# Patient Record
Sex: MALE | Race: WHITE | NOT HISPANIC OR LATINO | Employment: OTHER | URBAN - METROPOLITAN AREA
[De-identification: names, ages, dates, MRNs, and addresses within clinical notes are randomized per-mention and may not be internally consistent; named-entity substitution may affect disease eponyms.]

---

## 2017-02-13 ENCOUNTER — GENERIC CONVERSION - ENCOUNTER (OUTPATIENT)
Dept: OTHER | Facility: OTHER | Age: 82
End: 2017-02-13

## 2017-02-14 ENCOUNTER — GENERIC CONVERSION - ENCOUNTER (OUTPATIENT)
Dept: OTHER | Facility: OTHER | Age: 82
End: 2017-02-14

## 2017-02-15 ENCOUNTER — GENERIC CONVERSION - ENCOUNTER (OUTPATIENT)
Dept: OTHER | Facility: OTHER | Age: 82
End: 2017-02-15

## 2017-08-08 ENCOUNTER — GENERIC CONVERSION - ENCOUNTER (OUTPATIENT)
Dept: OTHER | Facility: OTHER | Age: 82
End: 2017-08-08

## 2017-11-28 ENCOUNTER — GENERIC CONVERSION - ENCOUNTER (OUTPATIENT)
Dept: OTHER | Facility: OTHER | Age: 82
End: 2017-11-28

## 2018-01-23 ENCOUNTER — ALLSCRIPTS OFFICE VISIT (OUTPATIENT)
Dept: OTHER | Facility: OTHER | Age: 83
End: 2018-01-23

## 2018-01-24 NOTE — PROGRESS NOTES
Assessment    1  Chronic obstructive pulmonary disease (496) (J44 9)   2  Intracerebral hemorrhage (431) (I61 9)   3  Paroxysmal atrial fibrillation (427 31) (I48 0)    Plan  Adult hypothyroidism    · Levothyroxine Sodium 50 MCG Oral Tablet; TAKE 1 TABLET DAILY -TAKE ON  EMPTY STOMACH  Benign prostatic hypertrophy    · Finasteride 5 MG Oral Tablet (Proscar)   · Dutasteride 0 5 MG Oral Capsule; take 1 capsule daily  Headache    · Amitriptyline HCl - 50 MG Oral Tablet  Health Maintenance    · Vitamin D (Ergocalciferol) 62540 UNIT Oral Capsule; TAKE 1 CAPSULE WEEKLY  Intracerebral hemorrhage    · Eliquis 2 5 MG Oral Tablet; take 1 tablet by mouth twice a day  Unlinked    · Flomax 0 4 MG Oral Capsule (Tamsulosin HCl)    Discussion/Summary  Possible side effects of new medications were reviewed with the patient/guardian today  The treatment plan was reviewed with the patient/guardian  The patient/guardian understands and agrees with the treatment plan      Chief Complaint  FOLLOW UP FROM [facility] IN DECEMBER 21 D/C JANUARY 5  SON STATES PT WAS IN 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  He has h/o intracerebral hemorrhage  He is now on Eliquis  His lipids are at goal on simvastatin  He has COPD  He is taking Serevent and Ventolin  He denies cough, congestion, or sputum production  His flu shot, Pneumovax, and Prevnar-13 are UTD  Review of Systems    Constitutional: No fever or chills, feels well, no tiredness, no recent weight gain or weight loss     Eyes: No complaints of eye pain, no red eyes, no discharge from eyes, no itchy eyes  ENT: no complaints of earache, no hearing loss, no nosebleeds, no nasal discharge, no sore throat, no hoarseness  Cardiovascular: No complaints of slow heart rate, no fast heart rate, no chest pain, no palpitations, no leg claudication, no lower extremity  Respiratory: No complaints of shortness of breath, no wheezing, no cough, no SOB on exertion, no orthopnea or PND  Gastrointestinal: No complaints of abdominal pain, no constipation, no nausea or vomiting, no diarrhea or bloody stools  Genitourinary: No complaints of dysuria, no incontinence, no hesitancy, no nocturia, no genital lesion, no testicular pain  Musculoskeletal: No complaints of arthralgia, no myalgias, no joint swelling or stiffness, no limb pain or swelling  Integumentary: No complaints of skin rash or skin lesions, no itching, no skin wound, no dry skin  Neurological: No compliants of headache, no confusion, no convulsions, no numbness or tingling, no dizziness or fainting, no limb weakness, no difficulty walking  Psychiatric: Is not suicidal, no sleep disturbances, no anxiety or depression, no change in personality, no emotional problems  Endocrine: No complaints of proptosis, no hot flashes, no muscle weakness, no erectile dysfunction, no deepening of the voice, no feelings of weakness  Hematologic/Lymphatic: No complaints of swollen glands, no swollen glands in the neck, does not bleed easily, no easy bruising  Active Problems    1  Abnormal weight loss (783 21) (R63 4)   2  Adult hypothyroidism (244 9) (E03 9)   3  Arthralgia of right ankle (719 47) (M25 571)   4  Arthritis (716 90) (M19 90)   5  Benign prostatic hypertrophy (600 00) (N40 0)   6  Carotid artery occlusion syndrome (435 8) (G45 1)   7  Chronic obstructive pulmonary disease (496) (J44 9)   8  Cough (786 2) (R05)   9  Dementia without behavioral disturbance (294 20) (F03 90)   10   Depression screen (V79 0) (Z13 89)   11  Emphysema (492 8) (J43 9)   12  Encounter for screening for other nervous system disorder (V80 09) (Z13 858)   13  Encounter for special screening examination for genitourinary disorder (V81 6) (Z13 89)   14  Fatigue (780 79) (R53 83)   15  Headache (784 0) (R51)   16  Hyperlipidemia (272 4) (E78 5)   17  Hypertension (401 9) (I10)   18  Intracerebral hemorrhage (431) (I61 9)   19  Lumbar radiculopathy (724 4) (M54 16)   20  Macular degeneration (362 50) (H35 30)   21  Neck pain (723 1) (M54 2)   22  Need for influenza vaccination (V04 81) (Z23)   23  Need for pneumococcal vaccination (V03 82) (Z23)   24  Otalgia, unspecified laterality (388 70) (H92 09)   25  Pain in ankle joint (719 47) (M25 579)   26  Paroxysmal atrial fibrillation (427 31) (I48 0)   27  Pigmented skin lesion (709 00) (L81 9)   28  Preop examination (V72 84) (Z01 818)   29  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)   30  Rhinorrhea (478 19) (J34 89)   31  Skin rash (782 1) (R21)   32  Vertigo (780 4) (R42)    Family History  Son    1  Family history of HTN (hypertension)    Social History    · Denied: History of Alcohol   · Denied: History of Drug Use   · Former smoker (V15 82) (T93 409)    Current Meds   1  Amitriptyline HCl - 50 MG Oral Tablet; TAKE 1 TABLET AT BEDTIME; Therapy: 93Tkz8476 to (Evaluate:18Nov2016)  Requested for: 19Oct2016; Last   Rx:19Oct2016 Ordered   2  Avodart 0 5 MG Oral Capsule; Therapy: (Recorded:14Jan2013) to Recorded   3  Donepezil HCl - 10 MG Oral Tablet; Take 1 tablet daily as directed; Therapy: 29Xji9545 to (Evaluate:30Jan2018)  Requested for: 15Hbm5528; Last   Rx:23Vxs5036 Ordered   4  Eliquis 2 5 MG Oral Tablet; take 1 tablet by mouth twice a day; Therapy: 71Zno2147 to (Evaluate:11Xfl2508)  Requested for: 21AHG5665; Last   Rx:06Nov2017 Ordered   5  Flomax 0 4 MG Oral Capsule; Therapy: (Recorded:14Jan2013) to Recorded   6   Gabapentin 300 MG Oral Capsule; take 1 capsule by mouth at bedtime; Therapy: 39Zbu7241 to (Evaluate:04Jun2018)  Requested for: 48VGY8559; Last   Rx:05Rmr1812 Ordered   7  Levothyroxine Sodium 50 MCG Oral Tablet; TAKE 1 TABLET DAILY -TAKE ON EMPTY   STOMACH; Therapy: 79QTS5619 to (Evaluate:19Ddt0301)  Requested for: 27Jun2017; Last   NM:[id number] Ordered   8  Mirtazapine 15 MG Oral Tablet; TAKE 1 TABLET AT BEDTIME; Therapy: 94Lwp0382 to (Evaluate:74Kva9696)  Requested for: 60VEA7640; Last   Rx:29Rdi8381 Ordered   9  Simvastatin 10 MG Oral Tablet; take 1 tablet daily at bedtime; Therapy: 22Efg3021 to (Evaluate:04Cxq7199)  Requested for: 37Hul0318; Last   Rx:14Vfy1258 Ordered   10  Tamsulosin HCl - 0 4 MG Oral Capsule; TAKE 1 CAPSULE BY MOUTH DAILY 30    MINUTES AFTER SAME MEAL EACH DAY; Therapy: 45APG7205 to ([phone])  Requested for: 51TZQ2597; Last    Rx:19Jan2018 Ordered   11  Ventolin  (90 Base) MCG/ACT Inhalation Aerosol Solution; Therapy: (Recorded:14Jan2013) to Recorded    Allergies    1  No Known Drug Allergies    Vitals  Vital Signs    Recorded: 38ZQS2727 08:00AM Recorded: 13YTK4877 07:58AM   Heart Rate 72 74   Respiration 15 15   Systolic 649    Diastolic 68    Height 5 ft 10 in 5 ft 10 in   Weight 161 lb 6 oz 161 lb 6 oz   BMI Calculated 23 16 23 16   BSA Calculated 1 91 1 91   O2 Saturation 93 92     Physical Exam    Constitutional   General appearance: No acute distress, well appearing and well nourished  Pulmonary   Respiratory effort: No increased work of breathing or signs of respiratory distress  irregularly irregular  Auscultation of lungs: Clear to auscultation, equal breath sounds bilaterally, no wheezes, no rales, no rhonci  Cardiovascular   Auscultation of heart: Abnormal     Examination of extremities for edema and/or varicosities: Normal     Abdomen   Abdomen: Non-tender, no masses  Liver and spleen: No hepatomegaly or splenomegaly           Results/Data  PHQ-2 Adult Depression Screening 23Jan2018 08:02AM User, Ahs     Test Name Result Flag Reference   PHQ-2 Adult Depression Score 0     Over the last two weeks, how often have you been bothered by any of the following problems?   Little interest or pleasure in doing things: Not at all - 0  Feeling down, depressed, or hopeless: Not at all - 0   PHQ-2 Adult Depression Screening Negative         Signatures   Electronically signed by : Tuyet Nguyen MD; Jan 23 2018  8:43AM EST                       (Author)

## 2018-03-21 DIAGNOSIS — F03.90 DEMENTIA WITHOUT BEHAVIORAL DISTURBANCE, UNSPECIFIED DEMENTIA TYPE (HCC): Primary | ICD-10-CM

## 2018-03-21 RX ORDER — DONEPEZIL HYDROCHLORIDE 10 MG/1
10 TABLET, FILM COATED ORAL DAILY
Qty: 90 TABLET | Refills: 3 | Status: SHIPPED | OUTPATIENT
Start: 2018-03-21

## 2018-03-21 RX ORDER — DONEPEZIL HYDROCHLORIDE 10 MG/1
1 TABLET, FILM COATED ORAL DAILY
COMMUNITY
Start: 2016-09-13 | End: 2018-03-21 | Stop reason: SDUPTHER

## 2018-05-14 DIAGNOSIS — I48.91 ATRIAL FIBRILLATION, UNSPECIFIED TYPE (HCC): Primary | ICD-10-CM

## 2018-05-15 RX ORDER — APIXABAN 2.5 MG/1
TABLET, FILM COATED ORAL
Qty: 60 TABLET | Refills: 5 | Status: SHIPPED | OUTPATIENT
Start: 2018-05-15

## 2018-05-21 DIAGNOSIS — F32.A DEPRESSION, UNSPECIFIED DEPRESSION TYPE: Primary | ICD-10-CM

## 2018-05-21 RX ORDER — MIRTAZAPINE 15 MG/1
TABLET, FILM COATED ORAL
Qty: 30 TABLET | Refills: 0 | Status: SHIPPED | OUTPATIENT
Start: 2018-05-21 | End: 2018-06-21 | Stop reason: SDUPTHER

## 2018-06-18 DIAGNOSIS — R52 PAIN: Primary | ICD-10-CM

## 2018-06-19 RX ORDER — GABAPENTIN 300 MG/1
CAPSULE ORAL
Qty: 30 CAPSULE | Refills: 5 | Status: SHIPPED | OUTPATIENT
Start: 2018-06-19 | End: 2018-12-15 | Stop reason: SDUPTHER

## 2018-06-20 RX ORDER — DUTASTERIDE 0.5 MG/1
1 CAPSULE, LIQUID FILLED ORAL DAILY
COMMUNITY

## 2018-06-20 RX ORDER — AMLODIPINE BESYLATE 5 MG/1
1 TABLET ORAL DAILY
COMMUNITY
Start: 2013-11-19

## 2018-06-20 RX ORDER — FINASTERIDE 5 MG/1
1 TABLET, FILM COATED ORAL DAILY
COMMUNITY
Start: 2018-01-19

## 2018-06-20 RX ORDER — SIMVASTATIN 10 MG
1 TABLET ORAL
COMMUNITY
Start: 2014-08-28 | End: 2018-10-16 | Stop reason: SDUPTHER

## 2018-06-20 RX ORDER — QUETIAPINE FUMARATE 25 MG/1
1 TABLET, FILM COATED ORAL
COMMUNITY
Start: 2018-01-19 | End: 2018-07-18 | Stop reason: SDUPTHER

## 2018-06-20 RX ORDER — AMITRIPTYLINE HYDROCHLORIDE 50 MG/1
1 TABLET, FILM COATED ORAL
COMMUNITY
Start: 2016-04-11

## 2018-06-20 RX ORDER — TAMSULOSIN HYDROCHLORIDE 0.4 MG/1
CAPSULE ORAL
COMMUNITY
End: 2018-07-18 | Stop reason: SDUPTHER

## 2018-06-20 RX ORDER — ALBUTEROL SULFATE 90 UG/1
AEROSOL, METERED RESPIRATORY (INHALATION)
COMMUNITY

## 2018-06-20 RX ORDER — ERGOCALCIFEROL 1.25 MG/1
1 CAPSULE ORAL WEEKLY
COMMUNITY
Start: 2018-01-19 | End: 2018-07-30 | Stop reason: SDUPTHER

## 2018-06-20 RX ORDER — LEVOTHYROXINE SODIUM 0.05 MG/1
TABLET ORAL
COMMUNITY
Start: 2016-04-04 | End: 2018-08-20 | Stop reason: SDUPTHER

## 2018-06-21 ENCOUNTER — OFFICE VISIT (OUTPATIENT)
Dept: FAMILY MEDICINE CLINIC | Facility: CLINIC | Age: 83
End: 2018-06-21
Payer: MEDICARE

## 2018-06-21 VITALS
WEIGHT: 159.8 LBS | OXYGEN SATURATION: 94 % | BODY MASS INDEX: 22.88 KG/M2 | HEART RATE: 84 BPM | SYSTOLIC BLOOD PRESSURE: 122 MMHG | DIASTOLIC BLOOD PRESSURE: 76 MMHG | HEIGHT: 70 IN | RESPIRATION RATE: 15 BRPM

## 2018-06-21 DIAGNOSIS — M75.91 RIGHT SUPRASPINATUS TENDINITIS: ICD-10-CM

## 2018-06-21 DIAGNOSIS — F32.A DEPRESSION, UNSPECIFIED DEPRESSION TYPE: ICD-10-CM

## 2018-06-21 DIAGNOSIS — J34.89 NASAL DISCHARGE: Primary | ICD-10-CM

## 2018-06-21 PROBLEM — M75.90 SUPRASPINATUS TENDONITIS: Status: ACTIVE | Noted: 2018-06-21

## 2018-06-21 PROCEDURE — 20550 NJX 1 TENDON SHEATH/LIGAMENT: CPT | Performed by: FAMILY MEDICINE

## 2018-06-21 PROCEDURE — 99214 OFFICE O/P EST MOD 30 MIN: CPT | Performed by: FAMILY MEDICINE

## 2018-06-21 RX ORDER — DEXAMETHASONE SODIUM PHOSPHATE 4 MG/ML
4 INJECTION, SOLUTION INTRA-ARTICULAR; INTRALESIONAL; INTRAMUSCULAR; INTRAVENOUS; SOFT TISSUE ONCE
Status: SHIPPED | OUTPATIENT
Start: 2018-06-21 | End: 2038-06-22

## 2018-06-21 RX ORDER — AZELASTINE 1 MG/ML
1 SPRAY, METERED NASAL 2 TIMES DAILY
Qty: 30 ML | Refills: 0 | Status: SHIPPED | OUTPATIENT
Start: 2018-06-21

## 2018-06-21 RX ORDER — MIRTAZAPINE 15 MG/1
TABLET, FILM COATED ORAL
Qty: 30 TABLET | Refills: 5 | Status: SHIPPED | OUTPATIENT
Start: 2018-06-21 | End: 2018-12-21 | Stop reason: SDUPTHER

## 2018-06-21 RX ORDER — IPRATROPIUM BROMIDE 42 UG/1
2 SPRAY, METERED NASAL 4 TIMES DAILY
COMMUNITY

## 2018-06-21 NOTE — PROGRESS NOTES
Assessment/Plan:    No problem-specific Assessment & Plan notes found for this encounter  Diagnoses and all orders for this visit:    Nasal discharge  -     azelastine (ASTELIN) 0 1 % nasal spray; 1 spray into each nostril 2 (two) times a day Use in each nostril as directed    Right supraspinatus tendinitis  -     Ambulatory referral to Physical Therapy; Future  -     XR shoulder 2+ vw right; Future  -     dexamethasone (DECADRON) injection 4 mg; Inject 1 mL (4 mg total) into the shoulder, thigh, or buttocks once     Other orders  -     amitriptyline (ELAVIL) 50 mg tablet; Take 1 tablet by mouth  -     amLODIPine (NORVASC) 5 mg tablet; Take 1 tablet by mouth daily  -     dutasteride (AVODART) 0 5 mg capsule; Take 1 capsule by mouth daily  -     finasteride (PROSCAR) 5 mg tablet; Take 1 tablet by mouth daily  -     tamsulosin (FLOMAX) 0 4 mg; Take by mouth  -     levothyroxine 50 mcg tablet; Take by mouth  -     QUEtiapine (SEROquel) 25 mg tablet; Take 1 tablet by mouth  -     salmeterol (SEREVENT DISKUS) 50 mcg/dose diskus inhaler; Inhale 1 puff every 12 (twelve) hours  -     simvastatin (ZOCOR) 10 mg tablet; Take 1 tablet by mouth  -     albuterol (VENTOLIN HFA) 90 mcg/act inhaler; Inhale  -     ergocalciferol (VITAMIN D2) 50,000 units; Take 1 capsule by mouth once a week  -     ipratropium (ATROVENT) 0 06 % nasal spray; 2 sprays into each nostril 4 (four) times a day          Subjective:      Patient ID: La Moody is a 80 y o  male  He has pain in the right shoulder for about the last month  He has trouble lifting his arm in front of him and at the side  He has no distinct trauma although he does state that he was lifting things from his home to take to an auction  It does not bother him at night  He has no constitutional symptoms  He has pain in the left inguinal area  He was told in the past that he has hernia  This is bothering him intermittently  Informred consent obtained    I injected his subdeltoid bursa on the right without difficulty  The following portions of the patient's history were reviewed and updated as appropriate:   He  has no past medical history on file  He   Patient Active Problem List    Diagnosis Date Noted    Supraspinatus tendonitis 06/21/2018     He  has no past surgical history on file  His family history includes Hypertension in his son; No Known Problems in his father and mother  He  reports that he has quit smoking  He has never used smokeless tobacco  He reports that he does not drink alcohol or use drugs    Current Outpatient Prescriptions   Medication Sig Dispense Refill    ipratropium (ATROVENT) 0 06 % nasal spray 2 sprays into each nostril 4 (four) times a day      albuterol (VENTOLIN HFA) 90 mcg/act inhaler Inhale      amitriptyline (ELAVIL) 50 mg tablet Take 1 tablet by mouth      amLODIPine (NORVASC) 5 mg tablet Take 1 tablet by mouth daily      azelastine (ASTELIN) 0 1 % nasal spray 1 spray into each nostril 2 (two) times a day Use in each nostril as directed 30 mL 0    donepezil (ARICEPT) 10 mg tablet Take 1 tablet (10 mg total) by mouth daily 90 tablet 3    dutasteride (AVODART) 0 5 mg capsule Take 1 capsule by mouth daily      ELIQUIS 2 5 MG TAKE 1 TABLET BY MOUTH TWICE A DAY 60 tablet 5    ergocalciferol (VITAMIN D2) 50,000 units Take 1 capsule by mouth once a week      finasteride (PROSCAR) 5 mg tablet Take 1 tablet by mouth daily      gabapentin (NEURONTIN) 300 mg capsule TAKE 1 CAPSULE BY MOUTH AT BEDTIME 30 capsule 5    levothyroxine 50 mcg tablet Take by mouth      mirtazapine (REMERON) 15 mg tablet TAKE 1 TABLET BY MOUTH AT BEDTIME 30 tablet 5    QUEtiapine (SEROquel) 25 mg tablet Take 1 tablet by mouth      salmeterol (SEREVENT DISKUS) 50 mcg/dose diskus inhaler Inhale 1 puff every 12 (twelve) hours      simvastatin (ZOCOR) 10 mg tablet Take 1 tablet by mouth      tamsulosin (FLOMAX) 0 4 mg Take by mouth       Current Facility-Administered Medications   Medication Dose Route Frequency Provider Last Rate Last Dose    dexamethasone (DECADRON) injection 4 mg  4 mg Intramuscular Once Adrian Fernando MD         Current Outpatient Prescriptions on File Prior to Visit   Medication Sig    donepezil (ARICEPT) 10 mg tablet Take 1 tablet (10 mg total) by mouth daily    ELIQUIS 2 5 MG TAKE 1 TABLET BY MOUTH TWICE A DAY    gabapentin (NEURONTIN) 300 mg capsule TAKE 1 CAPSULE BY MOUTH AT BEDTIME    [DISCONTINUED] mirtazapine (REMERON) 15 mg tablet TAKE 1 TABLET BY MOUTH AT BEDTIME     No current facility-administered medications on file prior to visit  He has No Known Allergies       Review of Systems   All other systems reviewed and are negative  Objective:      /76 (BP Location: Right arm, Patient Position: Sitting, Cuff Size: Standard)   Pulse 84   Resp 15   Ht 5' 10" (1 778 m)   Wt 72 5 kg (159 lb 12 8 oz)   SpO2 94%   BMI 22 93 kg/m²          Physical Exam   Constitutional: He is oriented to person, place, and time  He appears well-developed and well-nourished  Neck: Normal range of motion  Neck supple  Cardiovascular: Normal rate, regular rhythm, normal heart sounds and intact distal pulses  Pulmonary/Chest: Effort normal and breath sounds normal    Abdominal: Soft  Bowel sounds are normal    Musculoskeletal: Normal range of motion  Neurological: He is alert and oriented to person, place, and time  He has normal reflexes  Skin: Skin is warm and dry  Psychiatric: He has a normal mood and affect  His behavior is normal  Judgment and thought content normal    Nursing note and vitals reviewed

## 2018-06-26 DIAGNOSIS — J43.9 PULMONARY EMPHYSEMA, UNSPECIFIED EMPHYSEMA TYPE (HCC): Primary | ICD-10-CM

## 2018-06-26 RX ORDER — SALMETEROL XINAFOATE 50 MCG
BLISTER, WITH INHALATION DEVICE INHALATION
Qty: 1 INHALER | Refills: 5 | Status: SHIPPED | OUTPATIENT
Start: 2018-06-26 | End: 2019-01-26 | Stop reason: SDUPTHER

## 2018-07-18 DIAGNOSIS — F03.90 DEMENTIA WITHOUT BEHAVIORAL DISTURBANCE, UNSPECIFIED DEMENTIA TYPE (HCC): ICD-10-CM

## 2018-07-18 DIAGNOSIS — N40.0 BENIGN PROSTATIC HYPERPLASIA, UNSPECIFIED WHETHER LOWER URINARY TRACT SYMPTOMS PRESENT: Primary | ICD-10-CM

## 2018-07-19 RX ORDER — TAMSULOSIN HYDROCHLORIDE 0.4 MG/1
CAPSULE ORAL
Qty: 30 CAPSULE | Refills: 5 | Status: SHIPPED | OUTPATIENT
Start: 2018-07-19 | End: 2019-01-14 | Stop reason: SDUPTHER

## 2018-07-19 RX ORDER — QUETIAPINE FUMARATE 25 MG/1
TABLET, FILM COATED ORAL
Qty: 30 TABLET | Refills: 5 | Status: SHIPPED | OUTPATIENT
Start: 2018-07-19 | End: 2019-01-08

## 2018-07-30 DIAGNOSIS — E55.9 VITAMIN D DEFICIENCY: Primary | ICD-10-CM

## 2018-07-30 RX ORDER — ERGOCALCIFEROL 1.25 MG/1
CAPSULE ORAL WEEKLY
Qty: 4 CAPSULE | Refills: 3 | Status: SHIPPED | OUTPATIENT
Start: 2018-07-30 | End: 2018-12-21 | Stop reason: SDUPTHER

## 2018-08-02 ENCOUNTER — TELEPHONE (OUTPATIENT)
Dept: FAMILY MEDICINE CLINIC | Facility: CLINIC | Age: 83
End: 2018-08-02

## 2018-08-02 DIAGNOSIS — M75.91 RIGHT SUPRASPINATUS TENDINITIS: Primary | ICD-10-CM

## 2018-08-02 DIAGNOSIS — M79.601 PAIN IN BOTH UPPER EXTREMITIES: ICD-10-CM

## 2018-08-02 DIAGNOSIS — M79.602 PAIN IN BOTH UPPER EXTREMITIES: ICD-10-CM

## 2018-08-02 NOTE — TELEPHONE ENCOUNTER
Son called asking for script for PT for pulled muscle in the right arm   Asking it to be sent to [facility]

## 2018-08-03 PROBLEM — M79.602 PAIN IN BOTH UPPER EXTREMITIES: Status: ACTIVE | Noted: 2018-08-03

## 2018-08-03 PROBLEM — M79.601 PAIN IN BOTH UPPER EXTREMITIES: Status: ACTIVE | Noted: 2018-08-03

## 2018-08-20 DIAGNOSIS — E03.9 HYPOTHYROIDISM, UNSPECIFIED TYPE: Primary | ICD-10-CM

## 2018-08-21 RX ORDER — LEVOTHYROXINE SODIUM 0.05 MG/1
TABLET ORAL
Qty: 30 TABLET | Refills: 5 | Status: SHIPPED | OUTPATIENT
Start: 2018-08-21

## 2018-08-31 ENCOUNTER — OFFICE VISIT (OUTPATIENT)
Dept: FAMILY MEDICINE CLINIC | Facility: CLINIC | Age: 83
End: 2018-08-31
Payer: MEDICARE

## 2018-08-31 VITALS
WEIGHT: 160 LBS | SYSTOLIC BLOOD PRESSURE: 122 MMHG | HEART RATE: 88 BPM | BODY MASS INDEX: 22.9 KG/M2 | OXYGEN SATURATION: 94 % | HEIGHT: 70 IN | DIASTOLIC BLOOD PRESSURE: 76 MMHG | RESPIRATION RATE: 15 BRPM

## 2018-08-31 DIAGNOSIS — M79.602 PAIN IN BOTH UPPER EXTREMITIES: ICD-10-CM

## 2018-08-31 DIAGNOSIS — M79.601 PAIN IN BOTH UPPER EXTREMITIES: ICD-10-CM

## 2018-08-31 DIAGNOSIS — G89.29 CHRONIC RIGHT SHOULDER PAIN: Primary | ICD-10-CM

## 2018-08-31 DIAGNOSIS — M25.511 CHRONIC RIGHT SHOULDER PAIN: Primary | ICD-10-CM

## 2018-08-31 DIAGNOSIS — Z23 NEED FOR INFLUENZA VACCINATION: ICD-10-CM

## 2018-08-31 DIAGNOSIS — E03.9 ADULT HYPOTHYROIDISM: ICD-10-CM

## 2018-08-31 DIAGNOSIS — I10 HYPERTENSION, UNSPECIFIED TYPE: ICD-10-CM

## 2018-08-31 PROCEDURE — G0008 ADMIN INFLUENZA VIRUS VAC: HCPCS

## 2018-08-31 PROCEDURE — 90662 IIV NO PRSV INCREASED AG IM: CPT

## 2018-08-31 PROCEDURE — 99214 OFFICE O/P EST MOD 30 MIN: CPT | Performed by: FAMILY MEDICINE

## 2018-08-31 NOTE — PROGRESS NOTES
Assessment/Plan:    No problem-specific Assessment & Plan notes found for this encounter  Diagnoses and all orders for this visit:    Chronic right shoulder pain  -     TSH, 3rd generation; Future  -     T4, free; Future  -     Comprehensive metabolic panel; Future  -     CBC and differential; Future    Hypertension, unspecified type  -     TSH, 3rd generation; Future  -     T4, free; Future  -     Comprehensive metabolic panel; Future  -     CBC and differential; Future    Adult hypothyroidism  -     TSH, 3rd generation; Future  -     T4, free; Future  -     Comprehensive metabolic panel; Future  -     CBC and differential; Future    Pain in both upper extremities  -     TSH, 3rd generation; Future  -     T4, free; Future  -     Comprehensive metabolic panel; Future  -     CBC and differential; Future  -     Ambulatory referral to Orthopedic Surgery; Future          Subjective:      Patient ID: Sera Pascual is a 80 y o  male  He has right upper arm pain  He has pain with abduction and forward flexion  He is going to PT but he does not get much relief  I injected his shoulder previously (subacromial space), but he only got about 2 days of relief from it  He has excessive fatigue  He has hypothyroidism  I do not see a recent TSH in his chart  His BP is well controlled in the office today  He has no Cp or SOB  He has no HA or vision changes  The following portions of the patient's history were reviewed and updated as appropriate:   He  has no past medical history on file    He   Patient Active Problem List    Diagnosis Date Noted    Chronic right shoulder pain 08/31/2018    Pain in both upper extremities 08/03/2018    Supraspinatus tendonitis 06/21/2018    Adult hypothyroidism 04/04/2016    Carotid artery occlusion syndrome 03/25/2016    Hypertension 10/15/2013    Paroxysmal atrial fibrillation ([facility] [address] ) 09/26/2013    Hyperlipidemia 02/18/2013    Chronic obstructive pulmonary disease ([facility] ) 01/09/2013    Pulmonary emphysema ([facility] ) 01/09/2013     He  has no past surgical history on file  His family history includes Hypertension in his son; No Known Problems in his father and mother  He  reports that he has quit smoking  He has never used smokeless tobacco  He reports that he does not drink alcohol or use drugs  Current Outpatient Prescriptions   Medication Sig Dispense Refill    albuterol (VENTOLIN HFA) 90 mcg/act inhaler Inhale      amitriptyline (ELAVIL) 50 mg tablet Take 1 tablet by mouth      amLODIPine (NORVASC) 5 mg tablet Take 1 tablet by mouth daily      azelastine (ASTELIN) 0 1 % nasal spray 1 spray into each nostril 2 (two) times a day Use in each nostril as directed 30 mL 0    donepezil (ARICEPT) 10 mg tablet Take 1 tablet (10 mg total) by mouth daily 90 tablet 3    dutasteride (AVODART) 0 5 mg capsule Take 1 capsule by mouth daily      ELIQUIS 2 5 MG TAKE 1 TABLET BY MOUTH TWICE A DAY 60 tablet 5    ergocalciferol (VITAMIN D2) 50,000 units TAKE 1 CAPSULE BY MOUTH ONCE A WEEK 4 capsule 3    finasteride (PROSCAR) 5 mg tablet Take 1 tablet by mouth daily      gabapentin (NEURONTIN) 300 mg capsule TAKE 1 CAPSULE BY MOUTH AT BEDTIME 30 capsule 5    ipratropium (ATROVENT) 0 06 % nasal spray 2 sprays into each nostril 4 (four) times a day      levothyroxine 50 mcg tablet TAKE 1 TABLET EVERY DAY -TAKE ON EMPTY STOMACH 30 tablet 5    mirtazapine (REMERON) 15 mg tablet TAKE 1 TABLET BY MOUTH AT BEDTIME 30 tablet 5    QUEtiapine (SEROquel) 25 mg tablet TAKE 1 TABLET AT BEDTIME 30 tablet 5    SEREVENT DISKUS 50 MCG/DOSE diskus inhaler INHALE 1 PUFF EVERY 12 HOURS   1 Inhaler 5    simvastatin (ZOCOR) 10 mg tablet Take 1 tablet by mouth      tamsulosin (FLOMAX) 0 4 mg TAKE 1 CAPSULE BY MOUTH DAILY 30 MINUTES AFTER SAME MEAL EACH DAY 30 capsule 5     Current Facility-Administered Medications   Medication Dose Route Frequency Provider Last Rate Last Dose    dexamethasone (DECADRON) injection 4 mg  4 mg Intramuscular Once Aries Rojas MD         Current Outpatient Prescriptions on File Prior to Visit   Medication Sig    albuterol (VENTOLIN HFA) 90 mcg/act inhaler Inhale    amitriptyline (ELAVIL) 50 mg tablet Take 1 tablet by mouth    amLODIPine (NORVASC) 5 mg tablet Take 1 tablet by mouth daily    azelastine (ASTELIN) 0 1 % nasal spray 1 spray into each nostril 2 (two) times a day Use in each nostril as directed    donepezil (ARICEPT) 10 mg tablet Take 1 tablet (10 mg total) by mouth daily    dutasteride (AVODART) 0 5 mg capsule Take 1 capsule by mouth daily    ELIQUIS 2 5 MG TAKE 1 TABLET BY MOUTH TWICE A DAY    ergocalciferol (VITAMIN D2) 50,000 units TAKE 1 CAPSULE BY MOUTH ONCE A WEEK    finasteride (PROSCAR) 5 mg tablet Take 1 tablet by mouth daily    gabapentin (NEURONTIN) 300 mg capsule TAKE 1 CAPSULE BY MOUTH AT BEDTIME    ipratropium (ATROVENT) 0 06 % nasal spray 2 sprays into each nostril 4 (four) times a day    levothyroxine 50 mcg tablet TAKE 1 TABLET EVERY DAY -TAKE ON EMPTY STOMACH    mirtazapine (REMERON) 15 mg tablet TAKE 1 TABLET BY MOUTH AT BEDTIME    QUEtiapine (SEROquel) 25 mg tablet TAKE 1 TABLET AT BEDTIME    SEREVENT DISKUS 50 MCG/DOSE diskus inhaler INHALE 1 PUFF EVERY 12 HOURS   simvastatin (ZOCOR) 10 mg tablet Take 1 tablet by mouth    tamsulosin (FLOMAX) 0 4 mg TAKE 1 CAPSULE BY MOUTH DAILY 30 MINUTES AFTER SAME MEAL EACH DAY     Current Facility-Administered Medications on File Prior to Visit   Medication    dexamethasone (DECADRON) injection 4 mg     He has No Known Allergies       Review of Systems      Objective:      /76 (BP Location: Left arm, Patient Position: Sitting, Cuff Size: Standard)   Pulse 88   Resp 15   Ht 5' 10" (1 778 m)   Wt 72 6 kg (160 lb)   SpO2 94%   BMI 22 96 kg/m²          Physical Exam   Constitutional: He is oriented to person, place, and time  He appears well-developed and well-nourished     Neck: Normal range of motion  Cardiovascular: Normal rate, regular rhythm, normal heart sounds and intact distal pulses  Pulmonary/Chest: Effort normal and breath sounds normal    Abdominal: Soft  Bowel sounds are normal    Musculoskeletal: Normal range of motion  Neurological: He is alert and oriented to person, place, and time  He has normal reflexes  Skin: Skin is warm and dry  Psychiatric: He has a normal mood and affect  His behavior is normal  Judgment and thought content normal    Nursing note and vitals reviewed

## 2018-10-16 DIAGNOSIS — E78.5 HYPERLIPIDEMIA, UNSPECIFIED HYPERLIPIDEMIA TYPE: Primary | ICD-10-CM

## 2018-10-16 RX ORDER — SIMVASTATIN 10 MG
TABLET ORAL
Qty: 90 TABLET | Refills: 3 | Status: SHIPPED | OUTPATIENT
Start: 2018-10-16

## 2018-12-15 DIAGNOSIS — R52 PAIN: ICD-10-CM

## 2018-12-16 RX ORDER — GABAPENTIN 300 MG/1
CAPSULE ORAL
Qty: 30 CAPSULE | Refills: 3 | Status: SHIPPED | OUTPATIENT
Start: 2018-12-16

## 2018-12-21 DIAGNOSIS — F32.A DEPRESSION, UNSPECIFIED DEPRESSION TYPE: ICD-10-CM

## 2018-12-21 DIAGNOSIS — E55.9 VITAMIN D DEFICIENCY: ICD-10-CM

## 2018-12-21 RX ORDER — MIRTAZAPINE 15 MG/1
TABLET, FILM COATED ORAL
Qty: 30 TABLET | Refills: 5 | Status: SHIPPED | OUTPATIENT
Start: 2018-12-21

## 2018-12-21 RX ORDER — ERGOCALCIFEROL 1.25 MG/1
CAPSULE ORAL WEEKLY
Qty: 4 CAPSULE | Refills: 3 | Status: SHIPPED | OUTPATIENT
Start: 2018-12-21

## 2019-01-07 ENCOUNTER — TELEPHONE (OUTPATIENT)
Dept: FAMILY MEDICINE CLINIC | Facility: CLINIC | Age: 84
End: 2019-01-07

## 2019-01-07 DIAGNOSIS — F29 PSYCHOSIS, UNSPECIFIED PSYCHOSIS TYPE (HCC): Primary | ICD-10-CM

## 2019-01-08 RX ORDER — QUETIAPINE FUMARATE 50 MG/1
50 TABLET, FILM COATED ORAL
Qty: 30 TABLET | Refills: 5 | Status: SHIPPED | OUTPATIENT
Start: 2019-01-08

## 2019-01-14 DIAGNOSIS — N40.0 BENIGN PROSTATIC HYPERPLASIA, UNSPECIFIED WHETHER LOWER URINARY TRACT SYMPTOMS PRESENT: ICD-10-CM

## 2019-01-14 RX ORDER — TAMSULOSIN HYDROCHLORIDE 0.4 MG/1
CAPSULE ORAL
Qty: 30 CAPSULE | Refills: 5 | Status: SHIPPED | OUTPATIENT
Start: 2019-01-14

## 2019-01-26 DIAGNOSIS — J43.9 PULMONARY EMPHYSEMA, UNSPECIFIED EMPHYSEMA TYPE (HCC): ICD-10-CM

## 2019-01-28 RX ORDER — SALMETEROL XINAFOATE 50 MCG
BLISTER, WITH INHALATION DEVICE INHALATION
Qty: 3 INHALER | Refills: 3 | Status: SHIPPED | OUTPATIENT
Start: 2019-01-28

## 2019-06-20 ENCOUNTER — OFFICE VISIT (OUTPATIENT)
Dept: FAMILY MEDICINE CLINIC | Facility: CLINIC | Age: 84
End: 2019-06-20
Payer: MEDICARE

## 2019-06-20 VITALS
SYSTOLIC BLOOD PRESSURE: 128 MMHG | OXYGEN SATURATION: 92 % | HEART RATE: 81 BPM | DIASTOLIC BLOOD PRESSURE: 78 MMHG | HEIGHT: 70 IN | WEIGHT: 157 LBS | BODY MASS INDEX: 22.48 KG/M2

## 2019-06-20 DIAGNOSIS — Z00.00 MEDICARE ANNUAL WELLNESS VISIT, SUBSEQUENT: Primary | ICD-10-CM

## 2019-06-20 DIAGNOSIS — Z12.5 SCREENING FOR PROSTATE CANCER: ICD-10-CM

## 2019-06-20 PROCEDURE — G0439 PPPS, SUBSEQ VISIT: HCPCS | Performed by: NURSE PRACTITIONER
